# Patient Record
Sex: MALE | Race: WHITE | NOT HISPANIC OR LATINO | Employment: OTHER | URBAN - METROPOLITAN AREA
[De-identification: names, ages, dates, MRNs, and addresses within clinical notes are randomized per-mention and may not be internally consistent; named-entity substitution may affect disease eponyms.]

---

## 2020-01-20 ENCOUNTER — OFFICE VISIT - HEALTHEAST (OUTPATIENT)
Dept: FAMILY MEDICINE | Facility: CLINIC | Age: 40
End: 2020-01-20

## 2020-01-20 DIAGNOSIS — R03.0 ELEVATED BP WITHOUT DIAGNOSIS OF HYPERTENSION: ICD-10-CM

## 2020-01-20 DIAGNOSIS — Z00.00 ROUTINE GENERAL MEDICAL EXAMINATION AT A HEALTH CARE FACILITY: ICD-10-CM

## 2020-01-20 DIAGNOSIS — E66.3 OVERWEIGHT (BMI 25.0-29.9): ICD-10-CM

## 2020-01-20 DIAGNOSIS — E78.00 HYPERCHOLESTEREMIA: ICD-10-CM

## 2020-01-20 LAB
ALBUMIN SERPL-MCNC: 4.5 G/DL (ref 3.5–5)
ALP SERPL-CCNC: 93 U/L (ref 45–120)
ALT SERPL W P-5'-P-CCNC: 172 U/L (ref 0–45)
ANION GAP SERPL CALCULATED.3IONS-SCNC: 9 MMOL/L (ref 5–18)
AST SERPL W P-5'-P-CCNC: 83 U/L (ref 0–40)
BILIRUB SERPL-MCNC: 2.6 MG/DL (ref 0–1)
BUN SERPL-MCNC: 13 MG/DL (ref 8–22)
CALCIUM SERPL-MCNC: 9.9 MG/DL (ref 8.5–10.5)
CHLORIDE BLD-SCNC: 103 MMOL/L (ref 98–107)
CHOLEST SERPL-MCNC: 323 MG/DL
CO2 SERPL-SCNC: 27 MMOL/L (ref 22–31)
CREAT SERPL-MCNC: 0.84 MG/DL (ref 0.7–1.3)
ERYTHROCYTE [DISTWIDTH] IN BLOOD BY AUTOMATED COUNT: 10.8 % (ref 11–14.5)
FASTING STATUS PATIENT QL REPORTED: YES
GFR SERPL CREATININE-BSD FRML MDRD: >60 ML/MIN/1.73M2
GLUCOSE BLD-MCNC: 99 MG/DL (ref 70–125)
HBA1C MFR BLD: 5.7 % (ref 3.5–6)
HCT VFR BLD AUTO: 49.8 % (ref 40–54)
HDLC SERPL-MCNC: 49 MG/DL
HGB BLD-MCNC: 17 G/DL (ref 14–18)
LDLC SERPL CALC-MCNC: 237 MG/DL
MCH RBC QN AUTO: 30.8 PG (ref 27–34)
MCHC RBC AUTO-ENTMCNC: 34.1 G/DL (ref 32–36)
MCV RBC AUTO: 90 FL (ref 80–100)
PLATELET # BLD AUTO: 273 THOU/UL (ref 140–440)
PMV BLD AUTO: 7.6 FL (ref 7–10)
POTASSIUM BLD-SCNC: 4.9 MMOL/L (ref 3.5–5)
PROT SERPL-MCNC: 7.3 G/DL (ref 6–8)
RBC # BLD AUTO: 5.5 MILL/UL (ref 4.4–6.2)
SODIUM SERPL-SCNC: 139 MMOL/L (ref 136–145)
TRIGL SERPL-MCNC: 183 MG/DL
WBC: 5.5 THOU/UL (ref 4–11)

## 2020-01-20 ASSESSMENT — MIFFLIN-ST. JEOR: SCORE: 1755.41

## 2020-01-21 ENCOUNTER — COMMUNICATION - HEALTHEAST (OUTPATIENT)
Dept: FAMILY MEDICINE | Facility: CLINIC | Age: 40
End: 2020-01-21

## 2020-01-22 ENCOUNTER — COMMUNICATION - HEALTHEAST (OUTPATIENT)
Dept: FAMILY MEDICINE | Facility: CLINIC | Age: 40
End: 2020-01-22

## 2020-02-25 ENCOUNTER — OFFICE VISIT - HEALTHEAST (OUTPATIENT)
Dept: FAMILY MEDICINE | Facility: CLINIC | Age: 40
End: 2020-02-25

## 2020-02-25 DIAGNOSIS — E78.5 HYPERLIPIDEMIA, UNSPECIFIED HYPERLIPIDEMIA TYPE: ICD-10-CM

## 2020-02-25 DIAGNOSIS — E66.3 OVERWEIGHT (BMI 25.0-29.9): ICD-10-CM

## 2020-02-25 DIAGNOSIS — R03.0 ELEVATED BP WITHOUT DIAGNOSIS OF HYPERTENSION: ICD-10-CM

## 2020-02-25 ASSESSMENT — MIFFLIN-ST. JEOR: SCORE: 1706.64

## 2020-04-08 ENCOUNTER — COMMUNICATION - HEALTHEAST (OUTPATIENT)
Dept: FAMILY MEDICINE | Facility: CLINIC | Age: 40
End: 2020-04-08

## 2020-04-08 DIAGNOSIS — E78.00 HYPERCHOLESTEREMIA: ICD-10-CM

## 2020-11-04 ENCOUNTER — AMBULATORY - HEALTHEAST (OUTPATIENT)
Dept: NURSING | Facility: CLINIC | Age: 40
End: 2020-11-04

## 2021-06-04 VITALS
DIASTOLIC BLOOD PRESSURE: 98 MMHG | BODY MASS INDEX: 29.1 KG/M2 | WEIGHT: 192 LBS | HEART RATE: 68 BPM | SYSTOLIC BLOOD PRESSURE: 152 MMHG | RESPIRATION RATE: 16 BRPM | HEIGHT: 68 IN

## 2021-06-04 VITALS
HEIGHT: 68 IN | DIASTOLIC BLOOD PRESSURE: 86 MMHG | SYSTOLIC BLOOD PRESSURE: 138 MMHG | RESPIRATION RATE: 16 BRPM | WEIGHT: 183 LBS | BODY MASS INDEX: 27.74 KG/M2 | HEART RATE: 64 BPM

## 2021-06-05 NOTE — PROGRESS NOTES
Assessment/Plan   1. Routine general medical examination at a health care facility  Patient is here to establish care and to have an annual exam.  We will update his tetanus and flu shot today.  - Tdap vaccine greater than or equal to 8yo IM    2. Hypercholesteremia  He has a history of quite elevated cholesterol in the past with an LDL over 100.  Both his parents have had high cholesterol.  He is currently trying to work on some lifestyle changes such as diet, exercise, and weight loss.  We will see what his numbers look like today and make recommendations from there.  - Comprehensive Metabolic Panel  - HM2(CBC w/o Differential)  - Lipid Cascade    3. Elevated BP without diagnosis of hypertension  He has slightly elevated blood pressure today.  He does not have a diagnosis of hypertension and is asymptomatic.  He plans on cutting back on caffeine, starting to exercise more, and weight loss.  I discussed with him that he would be okay to watch for now.  I do want to see him back in 4 weeks for recheck to see how much progress he has made with weight loss and to recheck his blood pressure.    4. Overweight (BMI 25.0-29.9)  Patient does admit he is gained about 30 pounds over the last couple of years.  We discussed some strategies for helping with weight loss.  Will recheck in 4 weeks.  - Glycosylated Hemoglobin A1c    Subjective:      Devonte Fajardo is a 39 y.o. male who presents for an annual exam.  He is new to the [place] clinic today.  His last physical through [facility] was in 2016.  He does admit that he has a history of high cholesterol and had been on a statin for short period of time.  He admits that he is gained about 30 pounds in the last couple of years.  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  He was having a couple glasses of wine every night and sometimes more.  He recognizes that this may be a problem about a month ago.  Since then, he is only  having alcohol on the weekends, none during the week.  He does not feel like he has a problem with this at this time.  He is planning on seeing a psychologist through his insurance.  He does have elevated blood pressure today and states he does not think that this is been an issue in the past.  He does drink quite a bit of caffeine.  He is having red bowl and also some coffee and black tea.  He is exercising playing soccer once a week and then also going to the [facility] several times per week.  He feels like he eats fairly healthy.  He is motivated to make some changes.  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  He is a non-smoker.  Family history is significant for both parents with high cholesterol, diabetes, and high blood pressure.  Past surgical history includes hernia repair as a child    Healthy Habits:   Regular Exercise: Yes  Sunscreen Use: Yes  Healthy Diet: Yes  Dental Visits Regularly: Yes  Seat Belt: Yes  Sexually active: Yes  Monthly Self Testicular Exams:  No  Hemoccults: No  Flex Sig: No  Colonoscopy: No  Lipid Profile: Yes  Glucose Screen: Yes  Prevention of Osteoporosis: Yes  Last Dexa: No        Immunization History   Administered Date(s) Administered     Hep A, historic 12/01/2010, 01/03/2011     Hep B, Adult 08/10/2016     Hep B, historic 12/01/2010, 01/03/2011     Influenza,seasonal quad, PF, =/> 6months 12/07/2015     MMR 03/31/1982     Td, Adult, Absorbed 12/01/1998, 11/08/2005     Tdap 12/01/2010     Typhoid, historic, Unspecified 12/01/2010     Immunization status: due today.    No exam data present     No current outpatient medications on file.     No current facility-administered medications for this visit.      Past Medical History:   Diagnosis Date     Anal fissure     Created by Conversion      Open Fracture Of The Distal Phalanx Of The Right Fourth Finger     Created by Conversion      No past surgical history  on file.  Patient has no known allergies.  Family History   Problem Relation Age of Onset     No Medical Problems Mother      Diabetes Father      Cancer Paternal Grandfather         skin     Diabetes Paternal Grandfather      Stroke Paternal Grandfather      Breast cancer Neg Hx      Colon cancer Neg Hx      Heart disease Neg Hx      Social History     Socioeconomic History     Marital status:      Spouse name: Not on file     Number of children: Not on file     Years of education: Not on file     Highest education level: Not on file   Occupational History     Not on file   Social Needs     Financial resource strain: Not on file     Food insecurity:     Worry: Not on file     Inability: Not on file     Transportation needs:     Medical: Not on file     Non-medical: Not on file   Tobacco Use     Smoking status: Never Smoker     Smokeless tobacco: Never Used   Substance and Sexual Activity     Alcohol use: Yes     Alcohol/week: 7.0 - 10.0 standard drinks     Types: 7 - 10 Shots of liquor per week     Drug use: Not on file     Sexual activity: Yes     Partners: Female   Lifestyle     Physical activity:     Days per week: Not on file     Minutes per session: Not on file     Stress: Not on file   Relationships     Social connections:     Talks on phone: Not on file     Gets together: Not on file     Attends Islam service: Not on file     Active member of club or organization: Not on file     Attends meetings of clubs or organizations: Not on file     Relationship status: Not on file     Intimate partner violence:     Fear of current or ex partner: Not on file     Emotionally abused: Not on file     Physically abused: Not on file     Forced sexual activity: Not on file   Other Topics Concern     Not on file   Social History Narrative     Not on file       Review of Systems  Review of Systems          Objective:     There were no vitals filed for this visit.  There is no height or weight on file to calculate  BMI.    Physical  Physical Exam       Physical Exam:  General Appearance: Alert, cooperative, no distress, appears stated age  Head: Normocephalic, without obvious abnormality, atraumatic  Eyes: PERRL, conjunctiva/corneas clear, EOM's intact  Ears: Normal TM's and external ear canals, both ears  Nose: Nares normal, septum midline,mucosa normal, no drainage  Throat: Lips, mucosa, and tongue normal; teeth and gums normal  Neck: Supple, symmetrical, trachea midline, no adenopathy; thyroid: not enlarged, symmetric, no tenderness/mass/nodules; no carotid bruit  Back: Symmetric, no curvature, ROM normal, no CVA tenderness  Lungs: Clear to auscultation bilaterally, respirations unlabored  Heart: Regular rate and rhythm, S1 and S2 normal, no murmur, rub, or gallop, Abdomen: Soft, non-tender, bowel sounds active all four quadrants,  no masses, no organomegaly palpation.  No adnexal mass or tenderness.  : normal circumcised penis, testes descended bilaterally, no masses palpated, no hernias.  Rectal: normal tone, no masses, prostate smooth  Extremities: Extremities normal, atraumatic, no cyanosis or edema  Skin: Skin color, texture, turgor normal, no rashes or lesions  Lymph nodes: Cervical, supraclavicular, and axillary nodes normal  Neurologic: Normal

## 2021-06-06 NOTE — PROGRESS NOTES
Assessment/ Plan     1. Elevated BP without diagnosis of hypertension  Patient comes back for a 1 month follow-up.  I congratulated him on his success of losing 9 pounds since I saw him 1 month ago.  He is eating healthier and exercising.  His blood pressure is definitely better today, but diastolic is still a little bit elevated.  I do think we can continue to let him work on diet and exercise and recheck in 2 months.  He will be coming back in 2 months for fasting repeat labs.  If his blood pressures are still elevated at that point, would discuss starting a medication as he has a very strong family history.    2. Hyperlipidemia, unspecified hyperlipidemia type  Patient had quite high cholesterol when I saw him last month.  He has started his statin and things are going well.  He is made considerable changes with diet and exercise as well.    3. Overweight (BMI 25.0-29.9)  I congratulated patient on his 9 pound weight loss in the last 1 month.  He has done this with making dietary changes, exercising, and decreasing his alcohol intake.  He states he feels physically a lot better.  He feels like he is in better shape for playing soccer.  He feels like it is helped his stress level.  He is dropped 1 BMI point.  He plans on continuing to work on this and to try to increase his physical activity.  He did have elevated liver tests, likely from fatty liver.  He is due to see me back in 2 months at which time we will repeat his fasting blood work.  He was also had an A1c of 5.7.      Subjective:       Devonte Fajardo is a 39 y.o. male who presents for follow-up of hypertension.  When I saw the patient a month ago, he had elevated blood pressure, hyperlipidemia, elevated liver enzymes, and overall not feeling well.  We had a long discussion about his health including unhealthy eating habits and exercise.  He was definitely willing to make some changes.  He had been drinking quite a bit of alcohol as well.  He states that  "it is been a month since he has had any alcohol.  He has changed his eating habits and is eating about 20% healthier, but is eating smaller portions.  He is trying to get adequate sleep.  He feels like his stress level has come down.  He feels like he is in better shape for playing socce.  He did start taking Lipitor as his LDL was over 200.  He states he is tolerating it well without any adverse side effects.  He has a strong family history of hypertension and a lot of heart disease.  He has children at home and he is feeling motivated to make these lifestyle changes.    Relevant past medical, family, surgical, and social history reviewed with patient, unless noted in HPI, not pertinent for this visit.  Medications were discussed and reconciled.   Review of Systems   A 12 point comprehensive review of systems was negative except as noted.      Current Outpatient Medications   Medication Sig Dispense Refill     atorvastatin (LIPITOR) 40 MG tablet Take 1 tablet (40 mg total) by mouth daily. 90 tablet 3     cholecalciferol, vitamin D3, (VITAMIN D3 ORAL) Take by mouth.       COLLAGEN MISC Use As Directed.       docosahexanoic acid/epa (FISH OIL ORAL) Take by mouth.       MILK THISTLE ORAL Take by mouth.       multivitamin (MULTI-DAY ORAL) Take by mouth.       No current facility-administered medications for this visit.        Objective:      /86 (Patient Site: Left Arm)   Pulse 64   Resp 16   Ht 5' 7.5\" (1.715 m)   Wt 183 lb (83 kg)   BMI 28.24 kg/m        General appearance: alert, appears stated age and cooperative  Lungs: clear to auscultation bilaterally  Heart: regular rate and rhythm, S1, S2 normal, no murmur, click, rub or gallop      No results found for this or any previous visit (from the past 168 hour(s)).       This note has been dictated using voice recognition software. Any grammatical or context distortions are unintentional and inherent to the software  "

## 2021-06-16 PROBLEM — R03.0 ELEVATED BP WITHOUT DIAGNOSIS OF HYPERTENSION: Status: ACTIVE | Noted: 2020-01-20

## 2021-06-16 PROBLEM — E66.3 OVERWEIGHT: Status: ACTIVE | Noted: 2020-02-25

## 2021-06-26 ENCOUNTER — HEALTH MAINTENANCE LETTER (OUTPATIENT)
Age: 41
End: 2021-06-26

## 2021-07-03 NOTE — ADDENDUM NOTE
Addendum Note by Carli Mora MD at 1/20/2020 10:40 AM     Author: Carli Mora MD Service: -- Author Type: Physician    Filed: 1/22/2020 11:36 AM Encounter Date: 1/20/2020 Status: Signed    : Carli Mora MD (Physician)    Addended by: CARLI MORA on: 1/22/2020 11:36 AM        Modules accepted: Orders

## 2021-10-11 ENCOUNTER — HEALTH MAINTENANCE LETTER (OUTPATIENT)
Age: 41
End: 2021-10-11

## 2022-07-17 ENCOUNTER — HEALTH MAINTENANCE LETTER (OUTPATIENT)
Age: 42
End: 2022-07-17

## 2022-09-25 ENCOUNTER — HEALTH MAINTENANCE LETTER (OUTPATIENT)
Age: 42
End: 2022-09-25

## 2022-12-06 ENCOUNTER — HOSPITAL ENCOUNTER (EMERGENCY)
Facility: HOSPITAL | Age: 42
Discharge: HOME | End: 2022-12-06
Attending: STUDENT IN AN ORGANIZED HEALTH CARE EDUCATION/TRAINING PROGRAM | Admitting: STUDENT IN AN ORGANIZED HEALTH CARE EDUCATION/TRAINING PROGRAM
Payer: COMMERCIAL

## 2022-12-06 ENCOUNTER — NURSE TRIAGE (OUTPATIENT)
Dept: NURSING | Facility: CLINIC | Age: 42
End: 2022-12-06

## 2022-12-06 ENCOUNTER — TELEPHONE (OUTPATIENT)
Dept: NURSING | Facility: CLINIC | Age: 42
End: 2022-12-06

## 2022-12-06 ENCOUNTER — APPOINTMENT (OUTPATIENT)
Dept: RADIOLOGY | Facility: HOSPITAL | Age: 42
End: 2022-12-06
Attending: EMERGENCY MEDICINE
Payer: COMMERCIAL

## 2022-12-06 VITALS
OXYGEN SATURATION: 98 % | BODY MASS INDEX: 31.23 KG/M2 | TEMPERATURE: 97.9 F | HEIGHT: 67 IN | WEIGHT: 199 LBS | HEART RATE: 85 BPM | DIASTOLIC BLOOD PRESSURE: 123 MMHG | SYSTOLIC BLOOD PRESSURE: 205 MMHG | RESPIRATION RATE: 20 BRPM

## 2022-12-06 DIAGNOSIS — E78.5 HYPERLIPIDEMIA, UNSPECIFIED HYPERLIPIDEMIA TYPE: ICD-10-CM

## 2022-12-06 DIAGNOSIS — R07.9 CHEST PAIN, UNSPECIFIED TYPE: Primary | ICD-10-CM

## 2022-12-06 DIAGNOSIS — I10 ESSENTIAL HYPERTENSION: ICD-10-CM

## 2022-12-06 DIAGNOSIS — R79.89 ELEVATED LFTS: ICD-10-CM

## 2022-12-06 LAB
ALBUMIN SERPL BCG-MCNC: 5 G/DL (ref 3.5–5.2)
ALP SERPL-CCNC: 78 U/L (ref 40–129)
ALT SERPL W P-5'-P-CCNC: 174 U/L (ref 10–50)
ANION GAP SERPL CALCULATED.3IONS-SCNC: 12 MMOL/L (ref 7–15)
AST SERPL W P-5'-P-CCNC: 83 U/L (ref 10–50)
BASOPHILS # BLD AUTO: 0.1 10E3/UL (ref 0–0.2)
BASOPHILS NFR BLD AUTO: 1 %
BILIRUB SERPL-MCNC: 0.5 MG/DL
BUN SERPL-MCNC: 13.5 MG/DL (ref 6–20)
CALCIUM SERPL-MCNC: 9.2 MG/DL (ref 8.6–10)
CHLORIDE SERPL-SCNC: 99 MMOL/L (ref 98–107)
CREAT SERPL-MCNC: 0.84 MG/DL (ref 0.67–1.17)
DEPRECATED HCO3 PLAS-SCNC: 28 MMOL/L (ref 22–29)
EOSINOPHIL # BLD AUTO: 0.4 10E3/UL (ref 0–0.7)
EOSINOPHIL NFR BLD AUTO: 6 %
ERYTHROCYTE [DISTWIDTH] IN BLOOD BY AUTOMATED COUNT: 12.1 % (ref 10–15)
GFR SERPL CREATININE-BSD FRML MDRD: >90 ML/MIN/1.73M2
GLUCOSE SERPL-MCNC: 121 MG/DL (ref 70–99)
HCT VFR BLD AUTO: 46 % (ref 40–53)
HGB BLD-MCNC: 15.8 G/DL (ref 13.3–17.7)
IMM GRANULOCYTES # BLD: 0 10E3/UL
IMM GRANULOCYTES NFR BLD: 0 %
LYMPHOCYTES # BLD AUTO: 2.1 10E3/UL (ref 0.8–5.3)
LYMPHOCYTES NFR BLD AUTO: 28 %
MCH RBC QN AUTO: 29.8 PG (ref 26.5–33)
MCHC RBC AUTO-ENTMCNC: 34.3 G/DL (ref 31.5–36.5)
MCV RBC AUTO: 87 FL (ref 78–100)
MONOCYTES # BLD AUTO: 0.7 10E3/UL (ref 0–1.3)
MONOCYTES NFR BLD AUTO: 9 %
NEUTROPHILS # BLD AUTO: 4.3 10E3/UL (ref 1.6–8.3)
NEUTROPHILS NFR BLD AUTO: 56 %
NRBC # BLD AUTO: 0 10E3/UL
NRBC BLD AUTO-RTO: 0 /100
PLATELET # BLD AUTO: 261 10E3/UL (ref 150–450)
POTASSIUM SERPL-SCNC: 3.9 MMOL/L (ref 3.4–5.3)
PROT SERPL-MCNC: 7.6 G/DL (ref 6.4–8.3)
RBC # BLD AUTO: 5.31 10E6/UL (ref 4.4–5.9)
SODIUM SERPL-SCNC: 139 MMOL/L (ref 136–145)
TROPONIN T SERPL HS-MCNC: 7 NG/L
WBC # BLD AUTO: 7.5 10E3/UL (ref 4–11)

## 2022-12-06 PROCEDURE — 93005 ELECTROCARDIOGRAM TRACING: CPT | Performed by: STUDENT IN AN ORGANIZED HEALTH CARE EDUCATION/TRAINING PROGRAM

## 2022-12-06 PROCEDURE — 36415 COLL VENOUS BLD VENIPUNCTURE: CPT | Performed by: EMERGENCY MEDICINE

## 2022-12-06 PROCEDURE — 82040 ASSAY OF SERUM ALBUMIN: CPT | Performed by: EMERGENCY MEDICINE

## 2022-12-06 PROCEDURE — 84484 ASSAY OF TROPONIN QUANT: CPT | Performed by: EMERGENCY MEDICINE

## 2022-12-06 PROCEDURE — 85025 COMPLETE CBC W/AUTO DIFF WBC: CPT | Performed by: EMERGENCY MEDICINE

## 2022-12-06 PROCEDURE — 71046 X-RAY EXAM CHEST 2 VIEWS: CPT

## 2022-12-06 PROCEDURE — 99285 EMERGENCY DEPT VISIT HI MDM: CPT | Mod: 25

## 2022-12-06 PROCEDURE — 80053 COMPREHEN METABOLIC PANEL: CPT | Performed by: EMERGENCY MEDICINE

## 2022-12-06 PROCEDURE — 250N000013 HC RX MED GY IP 250 OP 250 PS 637: Performed by: STUDENT IN AN ORGANIZED HEALTH CARE EDUCATION/TRAINING PROGRAM

## 2022-12-06 RX ORDER — LISINOPRIL 10 MG/1
10 TABLET ORAL DAILY
Qty: 30 TABLET | Refills: 0 | Status: SHIPPED | OUTPATIENT
Start: 2022-12-06 | End: 2023-01-23

## 2022-12-06 RX ORDER — LISINOPRIL 5 MG/1
10 TABLET ORAL ONCE
Status: COMPLETED | OUTPATIENT
Start: 2022-12-06 | End: 2022-12-06

## 2022-12-06 RX ORDER — ATORVASTATIN CALCIUM 40 MG/1
40 TABLET, FILM COATED ORAL DAILY
Qty: 30 TABLET | Refills: 0 | Status: SHIPPED | OUTPATIENT
Start: 2022-12-06 | End: 2022-12-28

## 2022-12-06 RX ORDER — LISINOPRIL 5 MG/1
10 TABLET ORAL DAILY
Status: DISCONTINUED | OUTPATIENT
Start: 2022-12-06 | End: 2022-12-06

## 2022-12-06 RX ADMIN — LISINOPRIL 10 MG: 5 TABLET ORAL at 03:23

## 2022-12-06 NOTE — TELEPHONE ENCOUNTER
"    Nurse Triage SBAR    Is this a 2nd Level Triage? YES, LICENSED PRACTITIONER REVIEW IS REQUIRED    Situation:   Tonight patient wanted to take his blood pressure.  He got many error messages and then got:   210/140.    Background:   Patient has been having high blood pressure.  Patient was evaluated a while back and was started on some blood pressure medication.  That medication has  and patient was recommended to come in to be seen.  He has not done that yet but plans to.    Over the last month or so patient has had these \"feelings in his chest\".  It is not pain, it just is enough to let the patient know it is there.  They come and go.    Assessment:   Patient has no breathing issues, no weakness or numbness on one side of his body, no sweating, no confusion.    Protocol Recommended Disposition:   No disposition on file.    Recommendation:   What would you recommend the patient to do? Go to ED    Paged to provider Dr Jiménez    Does the patient meet one of the following criteria for ADS visit consideration? 16+ years old, with an [facility] PCP     TIP  Providers, please consider if this condition is appropriate for management at one of our Acute and Diagnostic Services sites.     If patient is a good candidate, please use dotphrase <dot>triageresponse and select Refer to ADS to document.      Provider Recommendation Follow Up:   Reached patient/caregiver. Informed of provider's recommendations. Patient verbalized understanding and agrees with the plan.         Reason for Disposition    [1] Systolic BP  >= 200 OR Diastolic >= 120 AND [2] having NO cardiac or neurologic symptoms    Additional Information    Negative: Difficult to awaken or acting confused (e.g., disoriented, slurred speech)    Negative: SEVERE difficulty breathing (e.g., struggling for each breath, speaks in single words)    Negative: [1] Weakness of the face, arm or leg on one side of the body AND [2] new-onset    Negative: [1] Numbness " (i.e., loss of sensation) of the face, arm or leg on one side of the body AND [2] new-onset    Negative: [1] Chest pain lasts > 5 minutes AND [2] history of heart disease (i.e., heart attack, bypass surgery, angina, angioplasty, CHF)    Negative: [1] Chest pain AND [2] took nitrogylcerin AND [3] pain was not relieved    Negative: Sounds like a life-threatening emergency to the triager    Negative: Symptom is main concern (e.g., headache, chest pain)    Negative: Low blood pressure is main concern    Negative: [1] Systolic BP  >= 160 OR Diastolic >= 100 AND [2] cardiac or neurologic symptoms (e.g., chest pain, difficulty breathing, unsteady gait, blurred vision)    Negative: [1] Pregnant 20 or more weeks (or postpartum < 6 weeks) AND [2] new hand or face swelling    Negative: [1] Pregnant 20 or more weeks (or postpartum < 6 weeks) AND [2] Systolic BP >= 160 OR Diastolic >= 100    Protocols used: BLOOD PRESSURE - HIGH-A-

## 2022-12-06 NOTE — ED TRIAGE NOTES
"Pt arrives ambulatory to triage endorsing high blood pressure hx and off of BP med: atorvastatin for about 1.5 years and hasn't followed up. Concerned about chest pressure\heaviness in the left upper middle chest x 1 month or 2 months \"maybe once during the week and forget about it\".  Endorses it is intermittent and maybe more so w/ exertion, stressors and anxiety. BP very elevated at home giving \"error\" called triage line and recommended to come to ED.    Pt presents anxious and endorses \"right now im perfectly fine\" but tonight I am a bit more anxious about it after reading things and thinking more about it and feels like he needs to get this checked out. 1/10 pain It seems to always be there if I think of it and prod at the left side of my chest.    Pt does endorse a bit of depression as he ages and life circumstances come forward. No acute concerns with this.  "

## 2022-12-06 NOTE — ED PROVIDER NOTES
EMERGENCY DEPARTMENT ENCOUNTER       ED Course & Medical Decision Making     2:50 AM  I met with patient for initial interview and encounter. PPE worn includes N95 mask and exam gloves.   3:00 AM We discussed plans for vital management at home and discusses plans for discharge.     Final Impression  42 year old male presents for evaluation of some chest tightness and pressure ongoing for the last month or so.  Has a history of hypertension high cholesterol, though has not been seen in the clinic in about 2 years, has been off all medications for about a year and a half.  Decided to check his blood pressure tonight and found it to be elevated in the 180s-200s which prompted concern and presentation to the ED.  In triage patient still hypertensive in the 180s- 200s, though EKG without ischemic changes, troponin negative after a month of symptoms.  Labs notable for some mild LFT bumps, though bilirubin normal.  This was discussed with patient, looks like he has had prior elevations of LFTs in the past.  Does endorse ongoing frequent alcohol use, states he knows he needs to cut back and is working on that.  With regards to his hypertension, no signs of endorgan dysfunction or MI, however does appear interested in getting treatment for his blood pressure and restarted on his cholesterol medication.  Based on chart review of prior clinic records I can see that he was prescribed atorvastatin 40 mg daily, though do not see any documentation of antihypertensives.  We will start on empiric lisinopril 10 mg daily as well as restarting the atorvastatin 40 mg daily.  We will also refer to the primary care clinic for close follow-up to see how his blood pressure is going and to have additional refills reviewed.  Patient agreeable to plan.  Will discharge home.    Prior to making a final disposition on this patient the results of patient's tests and other diagnostic studies were discussed with the patient. All questions were  "answered. Patient expressed understanding of the plan and was amenable to it.    Medications   lisinopril (ZESTRIL) tablet 10 mg (10 mg Oral Given 12/6/22 0323)       Final Impression     1. Chest pain, unspecified type    2. Essential hypertension    3. Elevated LFTs    4. Hyperlipidemia, unspecified hyperlipidemia type        Chief Complaint     Chief Complaint   Patient presents with     chest pressure\tightness     Hypertension       Pt arrives ambulatory to triage endorsing high blood pressure hx and off of BP med: atorvastatin for about 1.5 years and hasn't followed up. Concerned about chest pressure\heaviness in the left upper middle chest x 1 month or 2 months \"maybe once during the week and forget about it\".  Endorses it is intermittent and maybe more so w/ exertion, stressors and anxiety. BP very elevated at home giving \"error\" called triage line and recommended to come to ED.    Pt presents anxious and endorses \"right now im perfectly fine\" but tonight I am a bit more anxious about it after reading things and thinking more about it and feels like he needs to get this checked out. 1/10 pain It seems to always be there if I think of it and prod at the left side of my chest.    Pt does endorse a bit of depression as he ages and life circumstances come forward. No acute concerns with this.      HPI     Devonte Fajardo is a 42 year old male who presents for evaluation of chest pressure/tightness.     The patient presents with chest tightness/pressure since November 2022. He has not been taking his blood pressure medication for 1.5 years. He complains of having a chest discomfort before going to bed and decided to check his blood pressure. His blood pressure was ranging in the 180-200. He reports drinking alcohol often. Denies any other complaints or concerns at the moment.       I, Abigail Her am serving as a scribe to document services personally performed by Dr. Tomas Dumas MD, based on my observation " "and the provider's statements to me. I, Dr. Tomas Dumas MD attest that Abigail Her is acting in a scribe capacity, has observed my performance of the services and has documented them in accordance with my direction.    Past Medical History     Past Medical History:   Diagnosis Date     Anal fissure      Open fracture of distal phalanx or phalanges of hand      History reviewed. No pertinent surgical history.  Family History   Problem Relation Age of Onset     Diabetes Father      Cerebrovascular Disease Father      Cancer Maternal Grandmother      Cerebrovascular Disease Paternal Grandfather      No Known Problems Mother      Cancer Paternal Grandfather         skin     Diabetes Paternal Grandfather      Breast Cancer No family hx of      Colon Cancer No family hx of      Heart Disease No family hx of       Social History     Tobacco Use     Smoking status: Never     Smokeless tobacco: Never   Substance Use Topics     Alcohol use: Yes     Alcohol/week: 7.0 - 10.0 standard drinks     Comment: occ     No Known Allergies    Relevant past medical, surgical, family and social history as documented above, has been reviewed and discussed with patient. No changes or additions, unless otherwise noted in the HPI.    Current Medications     atorvastatin (LIPITOR) 40 MG tablet  lisinopril (ZESTRIL) 10 MG tablet  CONDYLOX 0.5 % EX GEL        Review of Systems     Constitutional: Denies fever  Respiratory: Denies cough or shortness of breath.      Cardiovascular: Denies palpitations or leg swelling. Positive for chest tightness/pressure/discomfort.   GI: Denies abdominal pain, nausea, vomiting, or dark, bloody stools.        Remainder of systems reviewed, unless noted in HPI all others negative.    Physical Exam     BP (!) 189/116 (BP Location: Right arm, Patient Position: Chair, Cuff Size: Adult Regular)   Pulse 95   Temp 98.4  F (36.9  C) (Oral)   Resp 20   Ht 1.702 m (5' 7\")   Wt 90.3 kg (199 lb)   SpO2 100%   BMI " 31.17 kg/m    Constitutional: Awake, alert, in no acute distress.      Head: Normocephalic, atraumatic.      ENT: Mucous membranes moist.      Eyes: PERRL, Conjunctiva normal.      Respiratory: Respirations even, unlabored. Lungs clear to ascultation bilaterally, in no acute respiratory distress.      Cardiovascular: Regular rate and rhythm. +2 radial pulses, equal bilaterally. No pitting edema.      GI: Abdomen soft, non-tender to palpation in all 4 quadrants. No guarding or rebound. Bowel sounds intact on all 4 quadrants.      Musculoskeletal: Moves all 4 extremities equally, strength symmetrical on bilateral uppers and lowers.      Integument: Warm, dry.    Neurologic: Alert & oriented x 3. Normal speech. Grossly normal motor and sensory function. No focal deficits noted.     Psychiatric: Normal mood and affect. Normal judgement.        Labs & Imaging     Results for orders placed or performed during the hospital encounter of 12/06/22   Chest XR,  PA & LAT    Impression    IMPRESSION: Negative chest. No previous study available for comparison. Current study will serve as a baseline for future follow-up.   Comprehensive metabolic panel   Result Value Ref Range    Sodium 139 136 - 145 mmol/L    Potassium 3.9 3.4 - 5.3 mmol/L    Chloride 99 98 - 107 mmol/L    Carbon Dioxide (CO2) 28 22 - 29 mmol/L    Anion Gap 12 7 - 15 mmol/L    Urea Nitrogen 13.5 6.0 - 20.0 mg/dL    Creatinine 0.84 0.67 - 1.17 mg/dL    Calcium 9.2 8.6 - 10.0 mg/dL    Glucose 121 (H) 70 - 99 mg/dL    Alkaline Phosphatase 78 40 - 129 U/L    AST 83 (H) 10 - 50 U/L     (H) 10 - 50 U/L    Protein Total 7.6 6.4 - 8.3 g/dL    Albumin 5.0 3.5 - 5.2 g/dL    Bilirubin Total 0.5 <=1.2 mg/dL    GFR Estimate >90 >60 mL/min/1.73m2   Troponin T, High Sensitivity (now)   Result Value Ref Range    Troponin T, High Sensitivity 7 <=22 ng/L   CBC with platelets and differential   Result Value Ref Range    WBC Count 7.5 4.0 - 11.0 10e3/uL    RBC Count 5.31 4.40  - 5.90 10e6/uL    Hemoglobin 15.8 13.3 - 17.7 g/dL    Hematocrit 46.0 40.0 - 53.0 %    MCV 87 78 - 100 fL    MCH 29.8 26.5 - 33.0 pg    MCHC 34.3 31.5 - 36.5 g/dL    RDW 12.1 10.0 - 15.0 %    Platelet Count 261 150 - 450 10e3/uL    % Neutrophils 56 %    % Lymphocytes 28 %    % Monocytes 9 %    % Eosinophils 6 %    % Basophils 1 %    % Immature Granulocytes 0 %    NRBCs per 100 WBC 0 <1 /100    Absolute Neutrophils 4.3 1.6 - 8.3 10e3/uL    Absolute Lymphocytes 2.1 0.8 - 5.3 10e3/uL    Absolute Monocytes 0.7 0.0 - 1.3 10e3/uL    Absolute Eosinophils 0.4 0.0 - 0.7 10e3/uL    Absolute Basophils 0.1 0.0 - 0.2 10e3/uL    Absolute Immature Granulocytes 0.0 <=0.4 10e3/uL    Absolute NRBCs 0.0 10e3/uL       EKG     Sinus rhythm, rate 82.  .  .  QTc 429.  No STEMI.  Inverted T wave in lead III, flattening to mildly inverted in aVF.  No priors available for comparison.     Tomas Dumas MD  12/06/22 9822

## 2022-12-07 NOTE — TELEPHONE ENCOUNTER
Pt returned call, message was relayed, patient has an appointment scheduled with [facility] end of January

## 2022-12-07 NOTE — TELEPHONE ENCOUNTER
Pt seen in ED last night for elevated /123.  Was given 10mg lisinopril Rx and 40mg atorvastatin Rx.  Pt discharged and pt is calling [facility] to see if it's okay to fly to [place] on Saturday.      Pt notes he started his medication this morning and before taking it, at 11am this morning his BP was 155/105 and then after taking it, at 3pm was 141/91.      Pt would like Dr Rodríguez input on whether or not it is safe to fly.     Please advise.     Mitzi Diaz RN  [facility] Triage Nurse Advisor   12/6/2022 6:33 PM

## 2022-12-27 ENCOUNTER — OFFICE VISIT (OUTPATIENT)
Dept: FAMILY MEDICINE | Facility: CLINIC | Age: 42
End: 2022-12-27
Attending: STUDENT IN AN ORGANIZED HEALTH CARE EDUCATION/TRAINING PROGRAM
Payer: COMMERCIAL

## 2022-12-27 VITALS
HEIGHT: 67 IN | TEMPERATURE: 98.1 F | SYSTOLIC BLOOD PRESSURE: 160 MMHG | WEIGHT: 192.5 LBS | BODY MASS INDEX: 30.21 KG/M2 | HEART RATE: 80 BPM | DIASTOLIC BLOOD PRESSURE: 98 MMHG | OXYGEN SATURATION: 98 %

## 2022-12-27 DIAGNOSIS — R07.89 ATYPICAL CHEST PAIN: ICD-10-CM

## 2022-12-27 DIAGNOSIS — Z23 NEED FOR VACCINATION: ICD-10-CM

## 2022-12-27 DIAGNOSIS — E78.5 HYPERLIPIDEMIA, UNSPECIFIED HYPERLIPIDEMIA TYPE: ICD-10-CM

## 2022-12-27 DIAGNOSIS — R79.89 ELEVATED LFTS: ICD-10-CM

## 2022-12-27 DIAGNOSIS — I10 ESSENTIAL HYPERTENSION: Primary | ICD-10-CM

## 2022-12-27 DIAGNOSIS — E78.5 HYPERLIPIDEMIA, UNSPECIFIED HYPERLIPIDEMIA TYPE: Primary | ICD-10-CM

## 2022-12-27 PROCEDURE — 90471 IMMUNIZATION ADMIN: CPT | Performed by: FAMILY MEDICINE

## 2022-12-27 PROCEDURE — 90686 IIV4 VACC NO PRSV 0.5 ML IM: CPT | Performed by: FAMILY MEDICINE

## 2022-12-27 PROCEDURE — 91312 COVID-19 VACCINE BIVALENT BOOSTER 12+ (PFIZER): CPT | Performed by: FAMILY MEDICINE

## 2022-12-27 PROCEDURE — 99214 OFFICE O/P EST MOD 30 MIN: CPT | Mod: 25 | Performed by: FAMILY MEDICINE

## 2022-12-27 PROCEDURE — 0124A COVID-19 VACCINE BIVALENT BOOSTER 12+ (PFIZER): CPT | Performed by: FAMILY MEDICINE

## 2022-12-27 RX ORDER — LISINOPRIL 40 MG/1
40 TABLET ORAL DAILY
Qty: 90 TABLET | Refills: 1 | Status: SHIPPED | OUTPATIENT
Start: 2022-12-27 | End: 2023-01-23

## 2022-12-27 NOTE — PROGRESS NOTES
"  Assessment & Plan     Essential hypertension  Increase lisinopril to 40mg/day. Check blood pressure  On home cuff and let me know how he is doing. BMP in two weeks - lab only appointment.  Continue to work on healthy lifestyle changes - less salt, more exercise, less alcohol. The patient indicates understanding of these issues and agrees with the plan.   - Primary Care Referral  - Basic metabolic panel  (Ca, Cl, CO2, Creat, Gluc, K, Na, BUN); Future  - lisinopril (ZESTRIL) 40 MG tablet; Take 1 tablet (40 mg) by mouth daily    Atypical chest pain  Sensation continues and makes him fearful to exercise. Will schedule stress test. The patient indicates understanding of these issues and agrees with the plan.   - Exercise Stress Test - Adult; Future    Elevated LFTs  Fatty liver vs etoh vs both. He is working on weight loss and alcohol abstinence. Will plan for recheck at his well exam end of jan.   - Primary Care Referral    Hyperlipidemia, unspecified hyperlipidemia type  Started back on lipitor. Tolerating it well. Will fast for his physical   - Primary Care Referral    Need for vaccination  Agrees to flu shot and covid booster.   - INFLUENZA VACCINE IM > 6 MONTHS VALENT IIV4 (AFLURIA/FLUZONE)  - COVID-19,PF,PFIZER BOOSTER BIVALENT (12+YRS)       MED REC REQUIRED  Post Medication Reconciliation Status: discharge medications reconciled and changed, per note/orders  BMI:   Estimated body mass index is 30.15 kg/m  as calculated from the following:    Height as of this encounter: 1.702 m (5' 7\").    Weight as of this encounter: 87.3 kg (192 lb 8 oz).   Weight management plan: Discussed healthy diet and exercise guidelines    Work on weight loss  Regular exercise    Return in about 2 weeks (around 1/10/2023) for Lab Work.  Then 4 weeks for physical     Paige Perkins MD  [facility] ANTHONY Whitney is a 42 year old, presenting for the following health issues:  ER F/U      HPI     ED/UC " "Followup:    Facility:  [facility]   Date of visit: 12/06/2022  Reason for visit: Chest pain   Current Status: Patient is doing a lot better   Patient said blood pressures have still been running high. Patient has been taking medications.  Plan from ER : 42 year old male presents for evaluation of some chest tightness and pressure ongoing for the last month or so.  Has a history of hypertension high cholesterol, though has not been seen in the clinic in about 2 years, has been off all medications for about a year and a half.  Decided to check his blood pressure tonight and found it to be elevated in the 180s-200s which prompted concern and presentation to the ED.  In triage patient still hypertensive in the 180s- 200s, though EKG without ischemic changes, troponin negative after a month of symptoms.  Labs notable for some mild LFT bumps, though bilirubin normal.  This was discussed with patient, looks like he has had prior elevations of LFTs in the past.  Does endorse ongoing frequent alcohol use, states he knows he needs to cut back and is working on that.     Restarted the atorvastatin at 40mg/day - no side effects     Started lisinopril 10mg/day  - blood pressures at home:    140/-160/    Stopped drinking since his visit to the ER   Lost 10 pounds within a few weeks   Has been working on eating less meat and having less salt     Left chest sensation  :   Is exercising but is afraid to exert himself further due to chest sensation   No associated symptoms of dizziness/jaw/arm pain/nausea/ sob    Family history: mom and dad with blood pressure issues and on meds    Review of Systems   Constitutional, HEENT, cardiovascular, pulmonary, gi and gu systems are negative, except as otherwise noted.      Objective    BP (!) 160/98   Pulse 80   Temp 98.1  F (36.7  C) (Tympanic)   Ht 1.702 m (5' 7\")   Wt 87.3 kg (192 lb 8 oz)   SpO2 98%   BMI 30.15 kg/m    Body mass index is 30.15 " kg/m .  Physical Exam   GENERAL: healthy, alert and no distress  EYES: Eyes grossly normal to inspection, PERRL and conjunctivae and sclerae normal  NECK: no adenopathy, no asymmetry, masses, or scars and thyroid normal to palpation  RESP: lungs clear to auscultation - no rales, rhonchi or wheezes  CV: regular rate and rhythm, normal S1 S2, no S3 or S4, no murmur, click or rub, no peripheral edema and peripheral pulses strong  NEURO: Normal strength and tone, mentation intact and speech normal  PSYCH: mentation appears normal, affect normal/bright

## 2022-12-27 NOTE — TELEPHONE ENCOUNTER
Pending Prescriptions:                       Disp   Refills    atorvastatin (LIPITOR) 40 MG tablet       30 tab*0            Sig: Take 1 tablet (40 mg) by mouth daily

## 2022-12-27 NOTE — TELEPHONE ENCOUNTER
"Requested Prescriptions   Pending Prescriptions Disp Refills    atorvastatin (LIPITOR) 40 MG tablet 30 tablet 0     Sig: Take 1 tablet (40 mg) by mouth daily       Statins Protocol Failed - 12/27/2022  5:10 PM        Failed - LDL on file in past 12 months     Recent Labs   Lab Test 01/20/20  1110   *             Failed - Recent (12 mo) or future (30 days) visit within the authorizing provider's specialty     Patient has had an office visit with the authorizing provider or a provider within the authorizing providers department within the previous 12 mos or has a future within next 30 days. See \"Patient Info\" tab in inbasket, or \"Choose Columns\" in Meds & Orders section of the refill encounter.              Passed - No abnormal creatine kinase in past 12 months     No lab results found.             Passed - Medication is active on med list        Passed - Patient is age 18 or older             "

## 2022-12-28 RX ORDER — ATORVASTATIN CALCIUM 40 MG/1
40 TABLET, FILM COATED ORAL DAILY
Qty: 30 TABLET | Refills: 0 | Status: SHIPPED | OUTPATIENT
Start: 2022-12-28 | End: 2023-01-31

## 2023-01-06 ENCOUNTER — TELEPHONE (OUTPATIENT)
Dept: FAMILY MEDICINE | Facility: CLINIC | Age: 43
End: 2023-01-06

## 2023-01-06 NOTE — TELEPHONE ENCOUNTER
Called patient back, RN does not see reference to a call out to patient. Suspect it could be r/t stress testing. Patient will call them to schedule.    Dominic Gillespie RN     [facility]

## 2023-01-06 NOTE — TELEPHONE ENCOUNTER
Patient Returning Call    Reason for call:  Incoming call from pt regarding a callback Pt would like to know what the call is regarding.    Information relayed to patient:  Pt bernadette receive a call nack    Patient has additional questions:  No    What are your questions/concerns:  No    Could we send this information to you in [facility] or would you prefer to receive a phone call?:   Patient would prefer a phone call   Okay to leave a detailed message?: Yes at Cell number on file:    Telephone Information:   Mobile [phone]

## 2023-01-10 ENCOUNTER — HOSPITAL ENCOUNTER (OUTPATIENT)
Dept: CARDIOLOGY | Facility: CLINIC | Age: 43
Discharge: HOME | End: 2023-01-10
Attending: FAMILY MEDICINE | Admitting: FAMILY MEDICINE
Payer: COMMERCIAL

## 2023-01-10 ENCOUNTER — LAB (OUTPATIENT)
Dept: LAB | Facility: CLINIC | Age: 43
End: 2023-01-10
Payer: COMMERCIAL

## 2023-01-10 DIAGNOSIS — Z11.4 SCREENING FOR HIV (HUMAN IMMUNODEFICIENCY VIRUS): ICD-10-CM

## 2023-01-10 DIAGNOSIS — Z11.59 NEED FOR HEPATITIS C SCREENING TEST: ICD-10-CM

## 2023-01-10 DIAGNOSIS — I10 ESSENTIAL HYPERTENSION: ICD-10-CM

## 2023-01-10 DIAGNOSIS — R07.89 ATYPICAL CHEST PAIN: ICD-10-CM

## 2023-01-10 LAB
ANION GAP SERPL CALCULATED.3IONS-SCNC: 11 MMOL/L (ref 7–15)
BUN SERPL-MCNC: 20 MG/DL (ref 6–20)
CALCIUM SERPL-MCNC: 10.2 MG/DL (ref 8.6–10)
CHLORIDE SERPL-SCNC: 102 MMOL/L (ref 98–107)
CREAT SERPL-MCNC: 0.94 MG/DL (ref 0.67–1.17)
DEPRECATED HCO3 PLAS-SCNC: 28 MMOL/L (ref 22–29)
GFR SERPL CREATININE-BSD FRML MDRD: >90 ML/MIN/1.73M2
GLUCOSE SERPL-MCNC: 108 MG/DL (ref 70–99)
POTASSIUM SERPL-SCNC: 4.4 MMOL/L (ref 3.4–5.3)
SODIUM SERPL-SCNC: 141 MMOL/L (ref 136–145)

## 2023-01-10 PROCEDURE — 36415 COLL VENOUS BLD VENIPUNCTURE: CPT

## 2023-01-10 PROCEDURE — 93018 CV STRESS TEST I&R ONLY: CPT | Performed by: INTERNAL MEDICINE

## 2023-01-10 PROCEDURE — 93016 CV STRESS TEST SUPVJ ONLY: CPT | Performed by: INTERNAL MEDICINE

## 2023-01-10 PROCEDURE — 87389 HIV-1 AG W/HIV-1&-2 AB AG IA: CPT

## 2023-01-10 PROCEDURE — 86803 HEPATITIS C AB TEST: CPT

## 2023-01-10 PROCEDURE — 93017 CV STRESS TEST TRACING ONLY: CPT

## 2023-01-10 PROCEDURE — 80048 BASIC METABOLIC PNL TOTAL CA: CPT

## 2023-01-11 LAB
HCV AB SERPL QL IA: NONREACTIVE
HIV 1+2 AB+HIV1 P24 AG SERPL QL IA: NONREACTIVE

## 2023-01-23 ENCOUNTER — E-VISIT (OUTPATIENT)
Dept: FAMILY MEDICINE | Facility: CLINIC | Age: 43
End: 2023-01-23
Payer: COMMERCIAL

## 2023-01-23 DIAGNOSIS — I10 ESSENTIAL HYPERTENSION: ICD-10-CM

## 2023-01-23 PROCEDURE — 99422 OL DIG E/M SVC 11-20 MIN: CPT | Performed by: FAMILY MEDICINE

## 2023-01-23 RX ORDER — HYDROCHLOROTHIAZIDE 12.5 MG/1
12.5 TABLET ORAL DAILY
Qty: 90 TABLET | Refills: 0 | Status: SHIPPED | OUTPATIENT
Start: 2023-01-23 | End: 2023-01-30

## 2023-01-23 RX ORDER — LISINOPRIL 40 MG/1
40 TABLET ORAL DAILY
Qty: 90 TABLET | Refills: 1 | Status: SHIPPED | OUTPATIENT
Start: 2023-01-23 | End: 2023-09-25

## 2023-01-23 NOTE — PATIENT INSTRUCTIONS
Thank you for choosing us for your care. I have placed an order for a prescription so that you can start treatment. View your full visit summary for details by clicking on the link below. Your pharmacist will able to address any questions you may have about the medication.     If you're not feeling better within 5-7 days, please schedule an appointment.  You can schedule an appointment right here in [place], or call [phone]  If the visit is for the same symptoms as your eVisit, we'll refund the cost of your eVisit if seen within seven days.    Thank you for choosing us for your care. Given your symptoms, I would like you to do a lab-only visit to determine what is causing them.  I have placed the orders.  Please schedule an appointment with the lab right here in [place], or call [phone].  I will let you know when the results are back and next steps to take.

## 2023-01-30 ENCOUNTER — OFFICE VISIT (OUTPATIENT)
Dept: FAMILY MEDICINE | Facility: CLINIC | Age: 43
End: 2023-01-30
Payer: COMMERCIAL

## 2023-01-30 VITALS
DIASTOLIC BLOOD PRESSURE: 82 MMHG | SYSTOLIC BLOOD PRESSURE: 136 MMHG | TEMPERATURE: 98.9 F | RESPIRATION RATE: 16 BRPM | HEIGHT: 67 IN | HEART RATE: 71 BPM | OXYGEN SATURATION: 99 % | WEIGHT: 184 LBS | BODY MASS INDEX: 28.88 KG/M2

## 2023-01-30 DIAGNOSIS — E78.5 HYPERLIPIDEMIA, UNSPECIFIED HYPERLIPIDEMIA TYPE: ICD-10-CM

## 2023-01-30 DIAGNOSIS — R73.9 ELEVATED BLOOD SUGAR: ICD-10-CM

## 2023-01-30 DIAGNOSIS — E78.2 MIXED HYPERLIPIDEMIA: ICD-10-CM

## 2023-01-30 DIAGNOSIS — I10 ESSENTIAL HYPERTENSION: Primary | ICD-10-CM

## 2023-01-30 DIAGNOSIS — R74.8 ELEVATED LIVER ENZYMES: ICD-10-CM

## 2023-01-30 LAB
ALBUMIN SERPL BCG-MCNC: 5.1 G/DL (ref 3.5–5.2)
ALP SERPL-CCNC: 82 U/L (ref 40–129)
ALT SERPL W P-5'-P-CCNC: 73 U/L (ref 10–50)
ANION GAP SERPL CALCULATED.3IONS-SCNC: 14 MMOL/L (ref 7–15)
AST SERPL W P-5'-P-CCNC: 48 U/L (ref 10–50)
BILIRUB SERPL-MCNC: 1.9 MG/DL
BUN SERPL-MCNC: 25.9 MG/DL (ref 6–20)
CALCIUM SERPL-MCNC: 10.2 MG/DL (ref 8.6–10)
CHLORIDE SERPL-SCNC: 101 MMOL/L (ref 98–107)
CHOLEST SERPL-MCNC: 214 MG/DL
CREAT SERPL-MCNC: 1.07 MG/DL (ref 0.67–1.17)
DEPRECATED HCO3 PLAS-SCNC: 23 MMOL/L (ref 22–29)
GFR SERPL CREATININE-BSD FRML MDRD: 89 ML/MIN/1.73M2
GGT SERPL-CCNC: 70 U/L (ref 8–61)
GLUCOSE SERPL-MCNC: 110 MG/DL (ref 70–99)
HBA1C MFR BLD: 5.8 % (ref 0–5.6)
HDLC SERPL-MCNC: 54 MG/DL
LDLC SERPL CALC-MCNC: 141 MG/DL
NONHDLC SERPL-MCNC: 160 MG/DL
POTASSIUM SERPL-SCNC: 4.6 MMOL/L (ref 3.4–5.3)
PROT SERPL-MCNC: 7.9 G/DL (ref 6.4–8.3)
SODIUM SERPL-SCNC: 138 MMOL/L (ref 136–145)
TRIGL SERPL-MCNC: 96 MG/DL

## 2023-01-30 PROCEDURE — 99214 OFFICE O/P EST MOD 30 MIN: CPT | Performed by: FAMILY MEDICINE

## 2023-01-30 PROCEDURE — 83036 HEMOGLOBIN GLYCOSYLATED A1C: CPT | Performed by: FAMILY MEDICINE

## 2023-01-30 PROCEDURE — 82977 ASSAY OF GGT: CPT | Performed by: FAMILY MEDICINE

## 2023-01-30 PROCEDURE — 80053 COMPREHEN METABOLIC PANEL: CPT | Performed by: FAMILY MEDICINE

## 2023-01-30 PROCEDURE — 36415 COLL VENOUS BLD VENIPUNCTURE: CPT | Performed by: FAMILY MEDICINE

## 2023-01-30 PROCEDURE — 80061 LIPID PANEL: CPT | Performed by: FAMILY MEDICINE

## 2023-01-30 RX ORDER — HYDROCHLOROTHIAZIDE 25 MG/1
25 TABLET ORAL DAILY
Qty: 90 TABLET | Refills: 1 | Status: SHIPPED | OUTPATIENT
Start: 2023-01-30 | End: 2023-09-25

## 2023-01-30 NOTE — PROGRESS NOTES
Assessment/ Plan     1. Essential hypertension  Devonte presents for a med check and follow-up on some labs.  He saw another provider and was started on lisinopril 40 mg and hydrochlorothiazide 12.5.  His home readings for the most part are fairly good but he does have some borderline high so we will increase the hydrochlorothiazide to 25 mg and he will continue to monitor at home.  - hydrochlorothiazide (HYDRODIURIL) 25 MG tablet; Take 1 tablet (25 mg) by mouth daily  Dispense: 90 tablet; Refill: 1    2. Mixed hyperlipidemia  He last had his lipids checked about 3 years ago prior to starting a statin.  He is due for fasting blood work.  - Lipid panel reflex to direct LDL Fasting; Future  - Lipid panel reflex to direct LDL Fasting    3. Elevated liver enzymes  He had some elevated liver enzymes in December.  He admits that he was drinking quite a bit of alcohol which he has been able to cut back on.  We will recheck today to see if these are trending down.  If not, then may need to do separate work-up for elevated liver enzymes.  - Comprehensive metabolic panel; Future  - GGT; Future  - Comprehensive metabolic panel  - GGT    4. Elevated blood sugar  He is in the prediabetes range at 5.8.  He is working on healthy diet, exercise, and weight loss.  We will continue to monitor.  - Hemoglobin A1c; Future  - Hemoglobin A1c      Subjective:      Devonte Fajardo is a 42 year old male who presents for follow-up of high blood pressure.  Its been several years since I have seen him.  He had an ER visit and then saw another provider for some chest pressure which is likely related to uncontrolled hypertension.  He is now been on lisinopril 40 mg and hydrochlorothiazide 12.5 mg.  He still taking a statin that was started several years ago.  He admits that he was really good before COVID as far as exercising and has lost some weight.  He states that over this last summer he gained some of the weight back and has been drinking more  "on a regular basis.  Since he was seen last, he has been able to cut down the alcohol.  He started working out again and is overall good work on some lifestyle changes.  He does have a strong family history of hypertension and high cholesterol.    Relevant past medical, family, surgical, and social history reviewed with patient, unless noted in HPI, not pertinent for this visit.  Medications were discussed and reconciled.   Review of Systems   A 12 point comprehensive review of systems was negative except as noted.      Current Outpatient Medications   Medication Sig Dispense Refill     hydrochlorothiazide (HYDRODIURIL) 25 MG tablet Take 1 tablet (25 mg) by mouth daily 90 tablet 1     atorvastatin (LIPITOR) 40 MG tablet Take 1 tablet (40 mg) by mouth daily 30 tablet 0     lisinopril (ZESTRIL) 40 MG tablet Take 1 tablet (40 mg) by mouth daily 90 tablet 1         Objective:     /82   Pulse 71   Temp 98.9  F (37.2  C)   Resp 16   Ht 1.702 m (5' 7\")   Wt 83.5 kg (184 lb)   SpO2 99%   BMI 28.82 kg/m      Body mass index is 28.82 kg/m .       General appearance: alert, appears stated age and cooperative  Lungs: clear to auscultation bilaterally  Heart: regular rate and rhythm, S1, S2 normal, no murmur, click, rub or gallop         Recent Results (from the past 168 hour(s))   Hemoglobin A1c   Result Value Ref Range    Hemoglobin A1C 5.8 (H) 0.0 - 5.6 %          This note has been dictated using voice recognition software. Any grammatical or context distortions are unintentional and inherent to the software  "

## 2023-01-31 RX ORDER — ATORVASTATIN CALCIUM 80 MG/1
80 TABLET, FILM COATED ORAL DAILY
Qty: 90 TABLET | Refills: 3 | Status: SHIPPED | OUTPATIENT
Start: 2023-01-31 | End: 2023-09-25

## 2023-08-05 ENCOUNTER — HEALTH MAINTENANCE LETTER (OUTPATIENT)
Age: 43
End: 2023-08-05

## 2023-09-22 ENCOUNTER — OFFICE VISIT (OUTPATIENT)
Dept: FAMILY MEDICINE | Facility: CLINIC | Age: 43
End: 2023-09-22
Payer: COMMERCIAL

## 2023-09-22 VITALS
BODY MASS INDEX: 27.85 KG/M2 | TEMPERATURE: 98.4 F | HEART RATE: 88 BPM | OXYGEN SATURATION: 100 % | RESPIRATION RATE: 16 BRPM | DIASTOLIC BLOOD PRESSURE: 90 MMHG | WEIGHT: 177.8 LBS | SYSTOLIC BLOOD PRESSURE: 127 MMHG

## 2023-09-22 DIAGNOSIS — R19.7 DIARRHEA, UNSPECIFIED TYPE: ICD-10-CM

## 2023-09-22 DIAGNOSIS — F10.10 ALCOHOL ABUSE: Primary | ICD-10-CM

## 2023-09-22 DIAGNOSIS — K92.1 BLOOD IN STOOL: ICD-10-CM

## 2023-09-22 DIAGNOSIS — R53.83 OTHER FATIGUE: ICD-10-CM

## 2023-09-22 LAB
BASOPHILS # BLD AUTO: 0 10E3/UL (ref 0–0.2)
BASOPHILS NFR BLD AUTO: 0 %
EOSINOPHIL # BLD AUTO: 0.1 10E3/UL (ref 0–0.7)
EOSINOPHIL NFR BLD AUTO: 0 %
ERYTHROCYTE [DISTWIDTH] IN BLOOD BY AUTOMATED COUNT: 11.7 % (ref 10–15)
HCT VFR BLD AUTO: 44.3 % (ref 40–53)
HGB BLD-MCNC: 15.5 G/DL (ref 13.3–17.7)
IMM GRANULOCYTES # BLD: 0 10E3/UL
IMM GRANULOCYTES NFR BLD: 0 %
LYMPHOCYTES # BLD AUTO: 0.8 10E3/UL (ref 0.8–5.3)
LYMPHOCYTES NFR BLD AUTO: 6 %
MCH RBC QN AUTO: 30.4 PG (ref 26.5–33)
MCHC RBC AUTO-ENTMCNC: 35 G/DL (ref 31.5–36.5)
MCV RBC AUTO: 87 FL (ref 78–100)
MONOCYTES # BLD AUTO: 0.9 10E3/UL (ref 0–1.3)
MONOCYTES NFR BLD AUTO: 6 %
NEUTROPHILS # BLD AUTO: 12.1 10E3/UL (ref 1.6–8.3)
NEUTROPHILS NFR BLD AUTO: 88 %
PLATELET # BLD AUTO: 244 10E3/UL (ref 150–450)
RBC # BLD AUTO: 5.1 10E6/UL (ref 4.4–5.9)
WBC # BLD AUTO: 13.9 10E3/UL (ref 4–11)

## 2023-09-22 PROCEDURE — 80053 COMPREHEN METABOLIC PANEL: CPT | Performed by: FAMILY MEDICINE

## 2023-09-22 PROCEDURE — 36415 COLL VENOUS BLD VENIPUNCTURE: CPT | Performed by: FAMILY MEDICINE

## 2023-09-22 PROCEDURE — 83690 ASSAY OF LIPASE: CPT | Performed by: FAMILY MEDICINE

## 2023-09-22 PROCEDURE — 99214 OFFICE O/P EST MOD 30 MIN: CPT | Performed by: FAMILY MEDICINE

## 2023-09-22 PROCEDURE — 85025 COMPLETE CBC W/AUTO DIFF WBC: CPT | Performed by: FAMILY MEDICINE

## 2023-09-22 NOTE — PROGRESS NOTES
Assessment:       Alcohol abuse  - CBC with platelets differential  - Lipase  - Comprehensive metabolic panel    Diarrhea, unspecified type  - Lipase  - Comprehensive metabolic panel    Blood in stool  - CBC with platelets differential    Other fatigue  - CBC with platelets differential  - Comprehensive metabolic panel         Plan:     Patient here admitting excessive alcohol use over the past several months this significantly concerned that he has a problem with this.  He is concerned about the recent fatigue and diarrhea he has had the past couple of days and would like some blood work checked.  Unclear etiology of the fatigue and diarrhea.  He is concerned about his liver.  It sounds as though the blood in his stool is likely more irritative possibly due to an internal hemorrhoid given the very small amount in nature of the bleeding and recommend he continue to monitor this.  We will check a CBC, lipase, and comprehensive metabolic panel.  He has a follow-up appointment scheduled with his PCP's office in 3 days where he can discuss the blood work and any further work-up that needs to be done.  If developing worsening diarrhea, abdominal pain, any jaundice, vomiting, or significantly increased blood in his stool or excessive fatigue he should be seen sooner.      Discussed the issues he is having with alcohol.  His wife is extremely supportive and they are working on developing healthier habits together.  He would like to start a new routine in the evening to help keep him from resorting to drinking.  He is considering AA.    MEDICATIONS:   No orders of the defined types were placed in this encounter.    Subjective:       42 year old male presents for evaluation with concerns about recent heavy alcohol use and development of fatigue and diarrhea over the past couple of days.  He has been drinking about a bottle of wine per night for the last couple of months.  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  He tends to be quite health-conscious throughout the day.  He stopped drinking about 3 days ago and since that time has been excessively fatigued and has had some diarrhea off and on for the past 3 days.  He has been slightly shaky and a little anxious feeling but not significantly so.  This is gotten better over the fatigue is continued.  He has had some abdominal discomfort off and on but not consistently.  What brought him in today primarily was that on 2 occasions earlier today he noticed some blood after he had a bowel movement.  The first time was after he wiped and noticed some blood on the tissue paper and the second time was a couple of drops in the toilet water following episode of loose stools.  He has not noticed any jaundice    Patient Active Problem List   Diagnosis    Hyperlipidemia    Esophageal Reflux    Elevated BP without diagnosis of hypertension    Overweight (BMI 25.0-29.9)       Past Medical History:   Diagnosis Date    Anal fissure     Created by Conversion     Open fracture of distal phalanx or phalanges of hand     Created by Conversion        No past surgical history on file.    Current Outpatient Medications   Medication    atorvastatin (LIPITOR) 80 MG tablet    hydrochlorothiazide (HYDRODIURIL) 25 MG tablet    lisinopril (ZESTRIL) 40 MG tablet     No current facility-administered medications for this visit.       No Known Allergies    Family History   Problem Relation Age of Onset    Diabetes Father     Cerebrovascular Disease Father     Cancer Maternal Grandmother     Cerebrovascular Disease Paternal Grandfather     No Known Problems Mother     Cancer Paternal Grandfather         skin    Diabetes Paternal Grandfather     Breast Cancer No family hx of     Colon Cancer No family hx of     Heart Disease No family hx of        Social History     Socioeconomic History    Marital status:      Spouse name: None    Number of  children: None    Years of education: None    Highest education level: None   Tobacco Use    Smoking status: Never    Smokeless tobacco: Never   Substance and Sexual Activity    Alcohol use: Yes     Alcohol/week: 7.0 - 10.0 standard drinks of alcohol     Comment: occ    Sexual activity: Yes     Partners: Female         Review of Systems  Pertinent items are noted in HPI.      Objective:     BP (!) 127/90   Pulse 88   Temp 98.4  F (36.9  C)   Resp 16   Wt 80.6 kg (177 lb 12.8 oz)   SpO2 100%   BMI 27.85 kg/m       General appearance: alert, appears stated age, and cooperative, appears calm and not anxious.  Not shaky.  Throat: lips, mucosa, and tongue normal; teeth and gums normal  Neck: no adenopathy  Eyes: No scleral icterus.  Lungs: clear to auscultation bilaterally  Heart: Rate and rhythm without murmurs  Abdomen: soft, non-tender; bowel sounds normal; no masses,  no organomegaly  Extremities: Well perfused  Skin: No jaundice      This note has been dictated using voice recognition software. Any grammatical or context distortions are unintentional and inherent to the software

## 2023-09-22 NOTE — PATIENT INSTRUCTIONS
We will let you know the results of your blood work when we get it back.    Keep your appointment with your primary care provider on Monday.  Follow-up sooner for worsening abdominal pain, excessive blood in your stool, lightheadedness, dizziness, worsening fatigue.

## 2023-09-23 LAB
ALBUMIN SERPL BCG-MCNC: 5.6 G/DL (ref 3.5–5.2)
ALP SERPL-CCNC: 67 U/L (ref 40–129)
ALT SERPL W P-5'-P-CCNC: 80 U/L (ref 0–70)
ANION GAP SERPL CALCULATED.3IONS-SCNC: 15 MMOL/L (ref 7–15)
AST SERPL W P-5'-P-CCNC: 57 U/L (ref 0–45)
BILIRUB SERPL-MCNC: 2.1 MG/DL
BUN SERPL-MCNC: 20.9 MG/DL (ref 6–20)
CALCIUM SERPL-MCNC: 10.2 MG/DL (ref 8.6–10)
CHLORIDE SERPL-SCNC: 97 MMOL/L (ref 98–107)
CREAT SERPL-MCNC: 0.79 MG/DL (ref 0.67–1.17)
DEPRECATED HCO3 PLAS-SCNC: 25 MMOL/L (ref 22–29)
EGFRCR SERPLBLD CKD-EPI 2021: >90 ML/MIN/1.73M2
GLUCOSE SERPL-MCNC: 107 MG/DL (ref 70–99)
LIPASE SERPL-CCNC: 26 U/L (ref 13–60)
POTASSIUM SERPL-SCNC: 4.3 MMOL/L (ref 3.4–5.3)
PROT SERPL-MCNC: 8.2 G/DL (ref 6.4–8.3)
SODIUM SERPL-SCNC: 137 MMOL/L (ref 136–145)

## 2023-09-25 ENCOUNTER — OFFICE VISIT (OUTPATIENT)
Dept: FAMILY MEDICINE | Facility: CLINIC | Age: 43
End: 2023-09-25
Payer: COMMERCIAL

## 2023-09-25 VITALS
WEIGHT: 177 LBS | HEIGHT: 67 IN | HEART RATE: 67 BPM | SYSTOLIC BLOOD PRESSURE: 124 MMHG | DIASTOLIC BLOOD PRESSURE: 88 MMHG | TEMPERATURE: 97.7 F | OXYGEN SATURATION: 99 % | BODY MASS INDEX: 27.78 KG/M2

## 2023-09-25 DIAGNOSIS — E78.5 HYPERLIPIDEMIA, UNSPECIFIED HYPERLIPIDEMIA TYPE: ICD-10-CM

## 2023-09-25 DIAGNOSIS — I10 ESSENTIAL HYPERTENSION: ICD-10-CM

## 2023-09-25 DIAGNOSIS — F10.11 ALCOHOL USE DISORDER, MILD, IN EARLY REMISSION: Primary | ICD-10-CM

## 2023-09-25 PROCEDURE — 90471 IMMUNIZATION ADMIN: CPT | Performed by: FAMILY MEDICINE

## 2023-09-25 PROCEDURE — 99214 OFFICE O/P EST MOD 30 MIN: CPT | Mod: 25 | Performed by: FAMILY MEDICINE

## 2023-09-25 PROCEDURE — 90686 IIV4 VACC NO PRSV 0.5 ML IM: CPT | Performed by: FAMILY MEDICINE

## 2023-09-25 RX ORDER — ATORVASTATIN CALCIUM 80 MG/1
80 TABLET, FILM COATED ORAL DAILY
Qty: 90 TABLET | Refills: 3 | Status: SHIPPED | OUTPATIENT
Start: 2023-09-25

## 2023-09-25 RX ORDER — NALTREXONE HYDROCHLORIDE 50 MG/1
50 TABLET, FILM COATED ORAL DAILY
Qty: 90 TABLET | Refills: 3 | Status: SHIPPED | OUTPATIENT
Start: 2023-09-25

## 2023-09-25 RX ORDER — HYDROCHLOROTHIAZIDE 25 MG/1
12.5 TABLET ORAL DAILY
COMMUNITY
Start: 2023-09-25 | End: 2023-11-06

## 2023-09-25 RX ORDER — LISINOPRIL 40 MG/1
40 TABLET ORAL DAILY
Qty: 90 TABLET | Refills: 1 | Status: SHIPPED | OUTPATIENT
Start: 2023-09-25 | End: 2024-04-17

## 2023-09-25 NOTE — PROGRESS NOTES
"  Assessment & Plan     Essential hypertension  Cont   - hydrochlorothiazide (HYDRODIURIL) 25 MG tablet; Take 0.5 tablets (12.5 mg) by mouth daily  - lisinopril (ZESTRIL) 40 MG tablet; Take 1 tablet (40 mg) by mouth daily    Alcohol use disorder, mild, in early remission  Rec aa I did discuss the naltrexone with him he is interested   - GGT; Future  - Comprehensive metabolic panel (BMP + Alb, Alk Phos, ALT, AST, Total. Bili, TP); Future  - naltrexone (DEPADE/REVIA) 50 MG tablet; Take 1 tablet (50 mg) by mouth daily    Hyperlipidemia, unspecified hyperlipidemia type  Cont on this   - atorvastatin (LIPITOR) 80 MG tablet; Take 1 tablet (80 mg) by mouth daily       BMI:   Estimated body mass index is 27.72 kg/m  as calculated from the following:    Height as of this encounter: 1.702 m (5' 7\").    Weight as of this encounter: 80.3 kg (177 lb).       3 mos with Dr. Lazara Ariza MD  [facility] ANTHONY Whitney is a 42 year old, presenting for the following health issues:  RECHECK        9/25/2023     9:56 AM   Additional Questions   Roomed by Kaci DORADO CMA     Taking 12.5 mg of the Hydrochlorothiazide, unsure why the dose changed. That is what he received from the pharmacy.   Checking BP at home:  94/61  64  94/58    These were over the weekend, when he wasn't feeling well.  131/73  129/90  118/65  125/72  112/63        History of Present Illness       Reason for visit:  Look at liver labs and labs in general from over the weekend in urgent care.He consumes 0 sweetened beverage(s) daily.He exercises with enough effort to increase his heart rate 30 to 60 minutes per day.  He exercises with enough effort to increase his heart rate 5 days per week.   He is taking medications regularly.   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      Objective    /88 (BP Location: Right arm, Patient Position: Sitting, Cuff Size: Adult Regular)   Pulse 67   Temp 97.7  F (36.5  C) (Tympanic)   Ht 1.702 m (5' 7\")   Wt 80.3 kg (177 lb)   SpO2 99%   BMI 27.72 kg/m    Body mass index is 27.72 kg/m .  Physical Exam   GENERAL: healthy, alert and no distress  PSYCH: mentation appears normal, affect normal/bright    No results found for any visits on 09/25/23.    Rmaa Ariza M.D.              "

## 2023-11-06 DIAGNOSIS — I10 ESSENTIAL HYPERTENSION: ICD-10-CM

## 2023-11-06 RX ORDER — HYDROCHLOROTHIAZIDE 25 MG/1
12.5 TABLET ORAL DAILY
Qty: 45 TABLET | Refills: 0 | Status: SHIPPED | OUTPATIENT
Start: 2023-11-06 | End: 2024-02-06

## 2023-11-06 NOTE — TELEPHONE ENCOUNTER
"Prescription approved per [facility] Refill Protocol.       Requested Prescriptions   Pending Prescriptions Disp Refills    hydrochlorothiazide (HYDRODIURIL) 25 MG tablet 45 tablet 0     Sig: Take 0.5 tablets (12.5 mg) by mouth daily       Diuretics (Including Combos) Protocol Passed - 11/6/2023  5:41 PM        Passed - Blood pressure under 140/90 in past 12 months     BP Readings from Last 3 Encounters:   09/25/23 124/88   09/22/23 (!) 127/90   01/30/23 136/82                 Passed - Recent (12 mo) or future (30 days) visit within the authorizing provider's specialty     Patient has had an office visit with the authorizing provider or a provider within the authorizing providers department within the previous 12 mos or has a future within next 30 days. See \"Patient Info\" tab in inbasket, or \"Choose Columns\" in Meds & Orders section of the refill encounter.              Passed - Medication is active on med list        Passed - Patient is age 18 or older        Passed - Normal serum creatinine on file in past 12 months     Recent Labs   Lab Test 09/22/23  1815   CR 0.79              Passed - Normal serum potassium on file in past 12 months     Recent Labs   Lab Test 09/22/23  1815   POTASSIUM 4.3                    Passed - Normal serum sodium on file in past 12 months     Recent Labs   Lab Test 09/22/23  1815                          Yvette Lezama RN 11/06/23 5:46 PM   "

## 2024-02-05 DIAGNOSIS — I10 ESSENTIAL HYPERTENSION: ICD-10-CM

## 2024-02-06 DIAGNOSIS — I10 ESSENTIAL HYPERTENSION: ICD-10-CM

## 2024-02-06 RX ORDER — HYDROCHLOROTHIAZIDE 25 MG/1
12.5 TABLET ORAL DAILY
Qty: 45 TABLET | Refills: 1 | Status: SHIPPED | OUTPATIENT
Start: 2024-02-06 | End: 2024-08-02

## 2024-02-07 RX ORDER — HYDROCHLOROTHIAZIDE 25 MG/1
12.5 TABLET ORAL DAILY
Qty: 45 TABLET | Refills: 1 | OUTPATIENT
Start: 2024-02-07

## 2024-04-17 DIAGNOSIS — I10 ESSENTIAL HYPERTENSION: ICD-10-CM

## 2024-04-17 RX ORDER — LISINOPRIL 40 MG/1
40 TABLET ORAL DAILY
Qty: 90 TABLET | Refills: 0 | Status: SHIPPED | OUTPATIENT
Start: 2024-04-17 | End: 2024-07-16

## 2024-04-17 NOTE — TELEPHONE ENCOUNTER
Pt is leaving [facility] on Saturday hoping to get his refill soon before he leaves.

## 2024-07-15 DIAGNOSIS — I10 ESSENTIAL HYPERTENSION: ICD-10-CM

## 2024-07-16 RX ORDER — LISINOPRIL 40 MG/1
40 TABLET ORAL DAILY
Qty: 90 TABLET | Refills: 0 | Status: SHIPPED | OUTPATIENT
Start: 2024-07-16 | End: 2024-10-07

## 2024-08-02 DIAGNOSIS — I10 ESSENTIAL HYPERTENSION: ICD-10-CM

## 2024-08-02 RX ORDER — HYDROCHLOROTHIAZIDE 25 MG/1
12.5 TABLET ORAL DAILY
Qty: 45 TABLET | Refills: 0 | Status: SHIPPED | OUTPATIENT
Start: 2024-08-02

## 2024-08-02 NOTE — TELEPHONE ENCOUNTER
GFR Estimate   Date Value Ref Range Status   09/22/2023 >90 >60 mL/min/1.73m2 Final   01/20/2020 >60 >60 mL/min/1.73m2 Final

## 2024-09-28 ENCOUNTER — HEALTH MAINTENANCE LETTER (OUTPATIENT)
Age: 44
End: 2024-09-28

## 2024-10-28 DIAGNOSIS — I10 ESSENTIAL HYPERTENSION: ICD-10-CM

## 2024-10-29 NOTE — TELEPHONE ENCOUNTER
Requested Prescriptions   Pending Prescriptions Disp Refills    hydrochlorothiazide (HYDRODIURIL) 25 MG tablet [Pharmacy Med Name: hydroCHLOROthiazide 25 MG Oral Tablet] 45 tablet 0     Sig: Take 1/2 (one-half) tablet by mouth once daily       Diuretics (Including Combos) Protocol Failed - 10/29/2024 10:53 AM        Failed - Most recent blood pressure under 140/90 in past 12 months     BP Readings from Last 3 Encounters:   09/25/23 124/88   09/22/23 (!) 127/90   01/30/23 136/82       No data recorded            Failed - Has GFR on file in past 12 months and most recent value is normal   GFR Estimate   Date Value Ref Range Status   09/22/2023 >90 >60 mL/min/1.73m2 Final   01/20/2020 >60 >60 mL/min/1.73m2 Final          Failed - Recent (12 mo) or future (90 days) visit within the authorizing provider's specialty     The patient must have completed an in-person or virtual visit within the past 12 months or has a future visit scheduled within the next 90 days with the authorizing provider s specialty.  Urgent care and e-visits do not quality as an office visit for this protocol.          Passed - Medication is active on med list        Passed - Medication indicated for associated diagnosis     Medication is associated with one or more of the following diagnoses:     Edema   Hypertension   Heart Failure   Meniere's Disease   Bilateral localized swelling of lower limbs   Pulmonary Hypertension          Passed - Patient is age 18 or older

## 2024-10-30 RX ORDER — HYDROCHLOROTHIAZIDE 25 MG/1
12.5 TABLET ORAL DAILY
Qty: 45 TABLET | Refills: 0 | Status: SHIPPED | OUTPATIENT
Start: 2024-10-30

## 2025-02-07 DIAGNOSIS — I10 ESSENTIAL HYPERTENSION: ICD-10-CM

## 2025-02-10 RX ORDER — LISINOPRIL 40 MG/1
40 TABLET ORAL DAILY
Qty: 30 TABLET | Refills: 0 | Status: SHIPPED | OUTPATIENT
Start: 2025-02-10